# Patient Record
Sex: FEMALE | Race: WHITE | HISPANIC OR LATINO
[De-identification: names, ages, dates, MRNs, and addresses within clinical notes are randomized per-mention and may not be internally consistent; named-entity substitution may affect disease eponyms.]

---

## 2022-04-26 PROBLEM — Z00.00 ENCOUNTER FOR PREVENTIVE HEALTH EXAMINATION: Status: ACTIVE | Noted: 2022-04-26

## 2022-04-29 ENCOUNTER — APPOINTMENT (OUTPATIENT)
Dept: PULMONOLOGY | Facility: CLINIC | Age: 52
End: 2022-04-29
Payer: COMMERCIAL

## 2022-04-29 DIAGNOSIS — Z78.9 OTHER SPECIFIED HEALTH STATUS: ICD-10-CM

## 2022-04-29 DIAGNOSIS — Z01.818 ENCOUNTER FOR OTHER PREPROCEDURAL EXAMINATION: ICD-10-CM

## 2022-04-29 DIAGNOSIS — Z01.811 ENCOUNTER FOR PREPROCEDURAL RESPIRATORY EXAMINATION: ICD-10-CM

## 2022-04-29 PROCEDURE — 93000 ELECTROCARDIOGRAM COMPLETE: CPT

## 2022-04-29 PROCEDURE — 71046 X-RAY EXAM CHEST 2 VIEWS: CPT

## 2022-04-29 PROCEDURE — 36415 COLL VENOUS BLD VENIPUNCTURE: CPT

## 2022-04-30 PROBLEM — Z78.9 NO PERTINENT PAST MEDICAL HISTORY: Status: RESOLVED | Noted: 2022-04-30 | Resolved: 2022-04-30

## 2022-04-30 NOTE — DISCUSSION/SUMMARY
[FreeTextEntry1] : malignancy?\par lymphoma\par tb\par \par the lung parenchyma is obscured by collapse so difficult to tell\par \par \par thoracentesis is planned.

## 2022-04-30 NOTE — HISTORY OF PRESENT ILLNESS
[TextBox_4] : had covid march 23rd\par \par nothing extraordinary\par \par couldn't breath well\par \par then two weeks after hving a masage and she pushed on the left  and area began having pain\par \par healthy woman had bcg-\par \par minmal smoker\par \par anxiety.  \par \par no travel out of the country\par \par [place] in [place] \par \par now with mild sob and pain on the left side\par \par no exposure to TB\par \par \par

## 2022-04-30 NOTE — REVIEW OF SYSTEMS
[Fatigue] : fatigue [Dyspnea] : dyspnea [Negative] : Endocrine [Cough] : no cough [Sputum] : no sputum

## 2022-04-30 NOTE — PHYSICAL EXAM
[Normal Oropharynx] : normal oropharynx [No Acute Distress] : no acute distress [Normal Appearance] : normal appearance [No Neck Mass] : no neck mass [Normal Rate/Rhythm] : normal rate/rhythm [Normal S1, S2] : normal s1, s2 [No Murmurs] : no murmurs [No Resp Distress] : no resp distress [No Abnormalities] : no abnormalities [Benign] : benign [Normal Gait] : normal gait [No Clubbing] : no clubbing [No Cyanosis] : no cyanosis [No Edema] : no edema [FROM] : FROM [Normal Color/ Pigmentation] : normal color/ pigmentation [No Focal Deficits] : no focal deficits [Oriented x3] : oriented x3 [Normal Affect] : normal affect [TextBox_11] : non nodes [TextBox_68] : almost absent bs on the left

## 2022-04-30 NOTE — PROCEDURE
[FreeTextEntry1] : chest film with large effusion\par \par ct with subcarinal nodes\par large effusion\par lung collapsed, areas of decreased attenuation in the consolidation\par pleural disease

## 2022-05-03 ENCOUNTER — APPOINTMENT (OUTPATIENT)
Dept: PULMONOLOGY | Facility: CLINIC | Age: 52
End: 2022-05-03
Payer: COMMERCIAL

## 2022-05-03 VITALS
OXYGEN SATURATION: 95 % | HEART RATE: 117 BPM | BODY MASS INDEX: 21.98 KG/M2 | SYSTOLIC BLOOD PRESSURE: 116 MMHG | DIASTOLIC BLOOD PRESSURE: 82 MMHG | RESPIRATION RATE: 12 BRPM | WEIGHT: 145 LBS | TEMPERATURE: 98 F | HEIGHT: 68 IN

## 2022-05-03 DIAGNOSIS — J90 PLEURAL EFFUSION, NOT ELSEWHERE CLASSIFIED: ICD-10-CM

## 2022-05-03 LAB
APTT BLD: 36.6 SEC
INR PPP: 1.04
PT BLD: 12.4 SEC

## 2022-05-03 PROCEDURE — 32555Z: CUSTOM

## 2022-05-03 NOTE — PROCEDURE
[FreeTextEntry1] : After discussion of risks, benefits, and alternatives to the procedure, informed consent was obtained. The patient was positioned seated and upright. Ultrasonography used to localize and raheel needle insertion site on the left posterolateral 7th intercostal space. Patient was prepped and site draped in usual sterile fashion. Lidocaine 1%(8ml) instilled for local anesthesia. Ultrasound again used to confirm target site. Thoracentesis catheter exchanged over 18GA needle via seldinger technique into the left pleural space with taco pleural fluid return. 120mL aspirate collected for fluid studies, cytology, and culture. Catheter subsequently connected to drainage bag and fluid drained via gravity for total of ~1500mL removed. Catheter subsequently removed and insertion site cleansed and dressed with bandage. \par \par Ultrasound used throughout procedure to assess for remaining fluid in left thoracic cavity. Anterior fluid collection still remained in place after drainage was completed.\par Drained was stopped after significant slowing of fluid drainage to gravity.\par \par Patient tolerated procedure well with no immediate procedural complications. Lung sliding and still some presence of fluid , mostly anteriorly, noted at the conclusion of procedure. \par \par She will follow up with Dr. Andrews to go over results of pleural fluid studies and next steps in management.

## 2022-05-05 LAB
AMYLASE FLD QL: 33 U/L
B PERT IGG+IGM PNL SER: ABNORMAL
CHOLEST FLD-MCNC: 85 MG/DL
COLOR FLD: NORMAL
CREAT FLD-MCNC: 0.75 MG/DL
EOSINOPHIL # FLD MANUAL: 0 %
FLUID INTAKE SUBSTANCE CLASS: NORMAL
GLUCOSE FLD-MCNC: 26 MG/DL
LDH FLD-CCNC: 1187 U/L
LYMPHOCYTES # FLD MANUAL: 30 %
MESOTHL CELL NFR FLD: 2 %
MONOS+MACROS NFR FLD MANUAL: 10 %
NEUTS SEG # FLD MANUAL: 58 %
PH FLD: 7.6
PROT FLD-MCNC: 3.9 G/DL
RBC # FLD MANUAL: ABNORMAL /UL
TOTAL CELLS COUNTED FLD: 3233 /UL
TRIGL SPEC-SCNC: 34 MG/DL
TUBE TYPE: NORMAL

## 2022-05-09 LAB
ADENDEAPLX: 24 U/L
BACTERIA FLD CULT: NORMAL

## 2022-05-13 LAB — OTHER FLUID CYTOLOGY: NORMAL

## 2022-06-15 ENCOUNTER — NON-APPOINTMENT (OUTPATIENT)
Age: 52
End: 2022-06-15

## 2022-06-22 ENCOUNTER — NON-APPOINTMENT (OUTPATIENT)
Age: 52
End: 2022-06-22

## 2022-06-22 LAB — ACID FAST STN FLD: NORMAL

## 2023-09-03 LAB
ALBUMIN SERPL ELPH-MCNC: 3.5 G/DL
ALP BLD-CCNC: 60 U/L
ALT SERPL-CCNC: 8 U/L
ANION GAP SERPL CALC-SCNC: 17 MMOL/L
AST SERPL-CCNC: 19 U/L
BASOPHILS # BLD AUTO: 0.08 K/UL
BASOPHILS NFR BLD AUTO: 0.6 %
BILIRUB SERPL-MCNC: 0.6 MG/DL
BUN SERPL-MCNC: 7 MG/DL
CALCIUM SERPL-MCNC: 9.4 MG/DL
CHLORIDE SERPL-SCNC: 102 MMOL/L
CO2 SERPL-SCNC: 21 MMOL/L
CREAT SERPL-MCNC: 0.71 MG/DL
EGFR: 103 ML/MIN/1.73M2
EOSINOPHIL # BLD AUTO: 0.27 K/UL
EOSINOPHIL NFR BLD AUTO: 1.9 %
HCT VFR BLD CALC: 40.8 %
HGB BLD-MCNC: 12.6 G/DL
IMM GRANULOCYTES NFR BLD AUTO: 0.7 %
LYMPHOCYTES # BLD AUTO: 1.93 K/UL
LYMPHOCYTES NFR BLD AUTO: 13.5 %
M TB IFN-G BLD-IMP: NEGATIVE
MAN DIFF?: NORMAL
MCHC RBC-ENTMCNC: 30.8 PG
MCHC RBC-ENTMCNC: 30.9 GM/DL
MCV RBC AUTO: 99.8 FL
MONOCYTES # BLD AUTO: 1.02 K/UL
MONOCYTES NFR BLD AUTO: 7.1 %
NEUTROPHILS # BLD AUTO: 10.9 K/UL
NEUTROPHILS NFR BLD AUTO: 76.2 %
PLATELET # BLD AUTO: 634 K/UL
POTASSIUM SERPL-SCNC: 4.2 MMOL/L
PROT SERPL-MCNC: 6.7 G/DL
QUANTIFERON TB PLUS MITOGEN MINUS NIL: 0.71 IU/ML
QUANTIFERON TB PLUS NIL: 0 IU/ML
QUANTIFERON TB PLUS TB1 MINUS NIL: 0 IU/ML
QUANTIFERON TB PLUS TB2 MINUS NIL: 0 IU/ML
RBC # BLD: 4.09 M/UL
RBC # FLD: 12.5 %
SODIUM SERPL-SCNC: 140 MMOL/L
WBC # FLD AUTO: 14.3 K/UL